# Patient Record
Sex: MALE | Race: WHITE
[De-identification: names, ages, dates, MRNs, and addresses within clinical notes are randomized per-mention and may not be internally consistent; named-entity substitution may affect disease eponyms.]

---

## 2017-01-01 ENCOUNTER — HOSPITAL ENCOUNTER (EMERGENCY)
Dept: HOSPITAL 80 - FED | Age: 27
Discharge: HOME | End: 2017-01-01
Payer: MEDICAID

## 2017-01-01 VITALS
OXYGEN SATURATION: 96 % | SYSTOLIC BLOOD PRESSURE: 119 MMHG | HEART RATE: 93 BPM | TEMPERATURE: 99.7 F | DIASTOLIC BLOOD PRESSURE: 72 MMHG

## 2017-01-01 VITALS — RESPIRATION RATE: 16 BRPM

## 2017-01-01 DIAGNOSIS — F17.200: ICD-10-CM

## 2017-01-01 DIAGNOSIS — J40: Primary | ICD-10-CM

## 2017-01-01 LAB
% IMMATURE GRANULYOCYTES: 0.6 % (ref 0–1.1)
ABSOLUTE IMMATURE GRANULOCYTES: 0.04 10^3/UL (ref 0–0.1)
ABSOLUTE NRBC COUNT: 0 10^3/UL (ref 0–0.01)
ADD DIFF?: NO
ADD MORPH?: NO
ADD SCAN?: YES
ANION GAP SERPL CALC-SCNC: 16 MEQ/L (ref 8–16)
APTT BLD: 31.4 SEC (ref 23–38)
ATYPICAL LYMPHOCYTE FLAG: 140 (ref 0–99)
BILIRUB SERPL-MCNC: 0.6 MG/DL (ref 0.1–1.4)
CALCIUM SERPL-MCNC: 9.5 MG/DL (ref 8.5–10.4)
CHLORIDE SERPL-SCNC: 104 MEQ/L (ref 97–110)
CO2 SERPL-SCNC: 21 MEQ/L (ref 22–31)
COLOR UR: YELLOW
CREAT SERPL-MCNC: 0.8 MG/DL (ref 0.7–1.3)
ERYTHROCYTE [DISTWIDTH] IN BLOOD BY AUTOMATED COUNT: 11.7 % (ref 11.5–15.2)
FRAGMENT RBC FLAG: 0 (ref 0–99)
GFR SERPL CREATININE-BSD FRML MDRD: > 60 ML/MIN/{1.73_M2}
GLUCOSE SERPL-MCNC: 96 MG/DL (ref 70–100)
HCT VFR BLD CALC: 46.8 % (ref 40–51)
HGB BLD-MCNC: 16.8 G/DL (ref 13.7–17.5)
INR PPP: 0.98 (ref 0.83–1.16)
LEFT SHIFT FLG: 0 (ref 0–99)
LIPEMIA HEMOLYSIS FLAG: 90 (ref 0–99)
MCH RBC BLDCO QN: 29.8 PG (ref 27.9–34.1)
MCHC RBC AUTO-ENTMCNC: 35.9 G/DL (ref 32.4–36.7)
MCV RBC AUTO: 83 FL (ref 81.5–99.8)
NITRITE UR QL STRIP: NEGATIVE
NRBC-AUTO%: 0 % (ref 0–0.2)
PH UR STRIP: 5 [PH] (ref 5–7.5)
PLATELET # BLD: 268 10^3/UL (ref 150–400)
PLATELET CLUMPS FLAG: 10 (ref 0–99)
PMV BLD AUTO: 9.5 FL (ref 8.7–11.7)
POTASSIUM SERPL-SCNC: 3.8 MEQ/L (ref 3.5–5.2)
PROTHROMBIN TIME: 12.9 SEC (ref 12–15)
RBC # BLD AUTO: 5.64 10^6/UL (ref 4.4–6.38)
SCAN: NEGATIVE
SODIUM SERPL-SCNC: 141 MEQ/L (ref 134–144)
SP GR UR STRIP: 1.02 (ref 1–1.03)

## 2017-01-01 NOTE — DX
Chest, PA Upright and Lateral Views - January 1, 2017, at 2:11 p.m.

 

Clinical History: 26-year-old male who has had a cough for 36 hours, and recent travel to [place].

 

Comparison Study: None.

 

Findings: Telemetry monitoring lead lines are present. The cardiac and mediastinal silhouettes are no
rmal in size. There is a moderate degree of central perihilar bronchial wall thickening. The inspirat
ory depth is to the 11th posterior rib level. The above features are nonspecific, and could represent
 a viral-induced bronchitis, although reactive airways' disease (asthma) could have a similar appeara
nce. There is no focal infiltrate, pleural effusion, peripheral interstitial edema, or pneumothorax. 
The trachea is midline. The osseous structures are age-appropriate.

 

Impression:  Moderate perihilar bronchitis. There is no focal infiltrate.

## 2017-01-01 NOTE — EDPHY
H & P


Time Seen by Provider: 01/01/17 13:42


HPI/ROS: 


CHIEF COMPLAINT:   fever, chest pain





HISTORY OF PRESENT ILLNESS:  Patient is a 26-year-old male who presents to the 

emergency department with fever and mild chest discomfort with cough.  Patient 

states that he just returned from a 10 day trip from [place].  On 12/25/2016 

developed diarrhea.  He took an over the counter medication called "Diarrescue.

"  This did not change his symptoms. He took his mom's antibiotics, cefixime, x 

3 days.  His diarrhea resolved and he felt better.  States that while improved 

he hurt his left foot.  Upon returning home he continued to have left foot 

pain.  There is no puncture wound or laceration.  He thought I broke my foot.

  Went to urgent care on 12/30/2016 and had a negative foot x-ray.  His foot 

pain has improved.  However on the 31st he woke up feeling crappy.  He noted 

that he had a fever to 102. He complains of mild lower substernal chest 

discomfort with occasional nonproductive cough.  No shortness of breath or 

dyspnea on exertion.  No calf pain or swelling. Patient states that his 

diarrhea has completely improved.  He has no abdominal symptoms.  He denies 

nausea or vomiting.  He has had no dysuria or frequency.  He denies neck pain 

or stiffness. No headache or visual change.  He has no sore throat. He has not 

noticed any rash.





REVIEW OF SYSTEMS:  


My complete review of systems is negative except as mentioned in the HPI.


Past Medical/Surgical History: 


Negative





Past surgical history:  Negative





Social history:  The patient does not smoke or use drugs.


Smoking Status: Current some day smoker


Physical Exam: 


39.4, 145/83, 114, 20, 96% on room air


GENERAL:  Well-appearing -patient appears better than his temperature and heart 

rate would indicate. No acute distress, alert.


HEENT:  Eyes normal to inspection, normal pharynx, no signs of dehydration.


NECK:  No thyromegaly, no lymphadenopathy, supple. No meningismus


RESPIRATORY:  Clear to auscultation bilaterally, no rales, rhonchi or wheezing.


CVS:  Regular rate and rhythm, no rubs, murmurs, or gallops.


ABDOMEN:  Soft, nontender, nondistended, no organomegaly.  Benign


BACK:  Normal to inspection, no CVA tenderness.


SKIN:  Normal color, no rash, warm, dry.  No pallor.


EXTREMITIES:  No pedal edema, no calf tenderness, no joint swelling.


NEURO/PSYCH:  Alert and oriented x3, normal mood and affect, normal motor 

sensory exam.  No obvious cranial nerve deficit.


Constitutional: 


 Initial Vital Signs











Temperature (C)  39.4 C H  01/01/17 13:33


 


Heart Rate  114 H  01/01/17 13:33


 


Respiratory Rate  20   01/01/17 13:33


 


Blood Pressure  145/83 H  01/01/17 13:33


 


O2 Sat (%)  96   01/01/17 13:33








 











O2 Delivery Mode               Room Air














Allergies/Adverse Reactions: 


 





No Known Allergies Allergy (Unverified 01/01/17 13:33)


 








Home Medications: 














 Medication  Instructions  Recorded


 


Cefixime  01/01/17


 


Finasteride  01/01/17














Medical Decision Making


ED Course/Re-evaluation: 


In the emergency department an IV was placed.  The patient was given a L of 

normal saline for hydration.  Laboratory studies, EKG, chest x-ray and urine 

were sent.  I will order stool sample since the patient is able to have a bowel 

movement.  Patient was given Toradol 30 mg IV for fever discomfort.  I 

discussed the plan with the patient answers questions.





I rechecked the patient on numerous occasions.  He looks well on his recheck.  

Had no new complaints.





Discussed the patient's laboratory studies.  Flu is negative. White count was 

normal. Lactate was 1.9.  CO2 is minimally low at 21. Urine was negative.





Chest x-ray:  Please refer the dictated report by Dr. Cummins.  Patient has 

bronchitis.  No focal infiltrate or pneumonia.





I discussed the case with Dr. Derick Stout on the phone.  He is not feel the 

patient needs further testing for malaria or other infectious Disease.





16 45:  I discussed the results with the patient again.  I answered all his 

questions.  On recheck he was doing well. No respiratory distress. His abdomen 

was benign.  I gave him warnings prior to leaving.  He will return with 

worsening symptoms.


Differential Diagnosis: 


My differential includes but is not limited to bacteremia, sepsis, pneumonia, 

bronchitis, infectious diarrhea, traveler's diarrhea, ameoba or worm infection, 

malaria





- Data Points


Laboratory Results: 


 Laboratory Results





 01/01/17 14:05 





 01/01/17 14:05 





 











  01/01/17 01/01/17





  15:25 14:05


 


WBC    6.43 10^3/uL





    (3.80-9.50) 


 


RBC    5.64 10^6/uL





    (4.40-6.38) 


 


Hgb    16.8 g/dL





    (13.7-17.5) 


 


Hct    46.8 %





    (40.0-51.0) 


 


MCV    83.0 fL





    (81.5-99.8) 


 


MCH    29.8 pg





    (27.9-34.1) 


 


MCHC    35.9 g/dL





    (32.4-36.7) 


 


RDW    11.7 %





    (11.5-15.2) 


 


Plt Count    268 10^3/uL





    (150-400) 


 


MPV    9.5 fL





    (8.7-11.7) 


 


Neut % (Auto)    67.8 %





    (39.3-74.2) 


 


Lymph % (Auto)    13.4 L %





    (15.0-45.0) 


 


Mono % (Auto)    17.1 H %





    (4.5-13.0) 


 


Eos % (Auto)    0.8 %





    (0.6-7.6) 


 


Baso % (Auto)    0.3 %





    (0.3-1.7) 


 


Nucleat RBC Rel Count    0.0 %





    (0.0-0.2) 


 


Absolute Neuts (auto)    4.36 10^3/uL





    (1.70-6.50) 


 


Absolute Lymphs (auto)    0.86 L 10^3/uL





    (1.00-3.00) 


 


Absolute Monos (auto)    1.10 H 10^3/uL





    (0.30-0.80) 


 


Absolute Eos (auto)    0.05 10^3/uL





    (0.03-0.40) 


 


Absolute Basos (auto)    0.02 10^3/uL





    (0.02-0.10) 


 


Absolute Nucleated RBC    0.00 10^3/uL





    (0-0.01) 


 


Immature Gran %    0.6 %





    (0.0-1.1) 


 


Immature Gran #    0.04 10^3/uL





    (0.00-0.10) 


 


PT    12.9 SEC





    (12.0-15.0) 


 


INR    0.98 





    (0.83-1.16) 


 


APTT    31.4 SEC





    (23.0-38.0) 


 


VBG Lactic Acid    1.9 mmol/L





    (0.7-2.1) 


 


Sodium    141 mEq/L





    (134-144) 


 


Potassium    3.8 mEq/L





    (3.5-5.2) 


 


Chloride    104 mEq/L





    () 


 


Carbon Dioxide    21 L mEq/l





    (22-31) 


 


Anion Gap    16 mEq/L





    (8-16) 


 


BUN    14 mg/dL





    (7-23) 


 


Creatinine    0.8 mg/dL





    (0.7-1.3) 


 


Estimated GFR    > 60 





   


 


Glucose    96 mg/dL





    () 


 


Calcium    9.5 mg/dL





    (8.5-10.4) 


 


Total Bilirubin    0.6 mg/dL





    (0.1-1.4) 


 


Urine Color  YELLOW   





   


 


Urine Appearance  CLEAR   





   


 


Urine pH  5.0   





   (5.0-7.5)  


 


Ur Specific Gravity  1.018   





   (1.002-1.030)  


 


Urine Protein  NEGATIVE   





   (NEGATIVE)  


 


Urine Ketones  NEGATIVE   





   (NEGATIVE)  


 


Urine Blood  NEGATIVE   





   (NEGATIVE)  


 


Urine Nitrate  NEGATIVE   





   (NEGATIVE)  


 


Urine Bilirubin  NEGATIVE   





   (NEGATIVE)  


 


Urine Urobilinogen  NEGATIVE EU  





   (0.2-1.0)  


 


Ur Leukocyte Esterase  NEGATIVE   





   (NEGATIVE)  


 


Urine Glucose  NEGATIVE   





   (NEGATIVE)  


 


Influenza Typ A,B (DFA)    NEGATIVE FOR FLU 





    (NEGATIVE) 











Medications Given: 


 








Discontinued Medications





Sodium Chloride (Ns)  1,000 mls @ 0 mls/hr IV ONCE ONE


   PRN Reason: Wide Open


   Stop: 01/01/17 14:28


   Last Admin: 01/01/17 14:29 Dose:  1,000 mls


Sodium Chloride (Ns)  1,000 mls @ 0 mls/hr IV ONCE ONE


   PRN Reason: Wide Open


   Stop: 01/01/17 14:29


   Last Admin: 01/01/17 15:28 Dose:  1,000 mls


Ketorolac Tromethamine (Toradol)  30 mg IVP EDNOW ONE


   Stop: 01/01/17 15:01


   Last Admin: 01/01/17 15:05 Dose:  30 mg








Departure





- Departure


Disposition: Home, Routine, Self-Care


Clinical Impression: 


 Bronchitis, Fever


Condition: Good


Instructions:  Acute Bronchitis (ED), Fever in Adults (ED)


Additional Instructions: 


Return to the emergency department with increasing fever, shortness of breath, 

cough, vomiting, diarrhea or any other concerns.





Your laboratory studies were unremarkable.





Your chest x-ray showed bronchitis.  No pneumonia.





You can take both acetaminophen and ibuprofen for fever control and discomfort.


Referrals: 


Mariya Ortega MD [Medical Doctor] - 5-7 days, call for appt.


Derick Stout MD [Medical Doctor] - 5-7 days, if not improved

## 2018-03-25 ENCOUNTER — HOSPITAL ENCOUNTER (EMERGENCY)
Dept: HOSPITAL 80 - FED | Age: 28
Discharge: HOME | End: 2018-03-25
Payer: MEDICAID

## 2018-03-25 VITALS — OXYGEN SATURATION: 94 %

## 2018-03-25 VITALS
TEMPERATURE: 98.4 F | RESPIRATION RATE: 16 BRPM | HEART RATE: 102 BPM | DIASTOLIC BLOOD PRESSURE: 88 MMHG | SYSTOLIC BLOOD PRESSURE: 142 MMHG

## 2018-03-25 DIAGNOSIS — F17.200: ICD-10-CM

## 2018-03-25 DIAGNOSIS — J18.1: Primary | ICD-10-CM

## 2018-03-25 NOTE — EDPHY
H & P


Stated Complaint: cough, fever, x1 week


Time Seen by Provider: 03/25/18 10:32


HPI/ROS: 


HPI:  This is a 28-year-old male who presents with





Chief Complaint: cough, fever, x1 week





Location:  Chest


Quality:  Cough


Duration:  2 weeks


Signs and Symptoms:+ fever for 2 days, no sore throat, + fatigue, + 

nonproductive cough, + body aches, no neck stiffness, + swollen glands, no ear 

pain, no nausea, no vomiting, no diarrhea, no urinary symptoms, no abdominal 

pain


Timing:  Worsening


Severity:  Moderate


Context:  Patient is generally healthy, + tobacco user, presents with a history 

of 2 week nonproductive cough that worsened over the last 2 days accompanied by 

fever T-max a 102 at home.  He also reports that he had sudden onset of 

fatigue and body aches.  Did not receive influenza vaccine this year.  No 

history of lung disease.  Reports decreased appetite but drinking fluids.  No 

recent foreign travel.  Taking over-the-counter medications with mild relief. 


Modifying Factors:  See above





Comment: 








ROS: see HPI


Constitutional:+ fever, no chills, no weight loss


Eyes:  No blurred vision


Respiratory:  No shortness of breath, + cough


Cardiovascular:  No chest pain


Gastrointestinal:  No nausea, no vomiting, no diarrhea 


Genitourinary:  No dysuria 


Extremities:  No myalgias 


Neurologic:  No weakness, no numbness


Skin:  No rashes


Hematologic:  No bruising, no bleeding





MEDICAL/SURGICAL/SOCIAL HISTORY: 


Medical history:  Generally healthy.  Does not take any regular medications.


Surgical history:  Denies


Social history:  Employed. 











CONSTITUTIONAL:  Polite and cooperative adult male, awake and alert, no obvious 

distress


HEENT: Atraumatic and normocephalic, PERRL, EOMI. Tympanic membranes clear. 

Oropharynx clear, no exudate and moist pink mucosa.  Airway patent.  No 

lymphadenopathy.  No meningismus.


Cardiovascular: Normal S1/S2, tachycardia, regular rhythm, without murmur rub 

or gallop.


PULMONARY/CHEST:  Symmetrical and nontender. Clear to auscultation bilaterally. 

Good air movement. No accessory muscle usage.


ABDOMEN:  Soft, nondistended, nontender, no rebound, no guarding, no peritoneal 

signs, no masses or organomegaly. No CVAT.


EXTREMITIES:  2/2 pulses, strength 5/5, no deformities, no clubbing, no 

cyanosis or edema.


NEUROLOGICAL: no focal neuro deficits.  GCS 15.


SKIN: Warm and dry, no erythema. no rash.  Good capillary refill. 








Source: Patient


Exam Limitations: No limitations





- Personal History


Tetanus Vaccine Date: 2016





- Medical/Surgical History


Hx Asthma: No


Hx Chronic Respiratory Disease: No


Hx Diabetes: No


Hx Cardiac Disease: No


Hx Renal Disease: No


Hx Cirrhosis: No


Hx Alcoholism: No


Hx HIV/AIDS: No


Hx Splenectomy or Spleen Trauma: No


Other PMH: Denies





- Social History


Smoking Status: Current some day smoker


Constitutional: 


 Initial Vital Signs











Temperature (C)  38.7 C H  03/25/18 10:17


 


Heart Rate  112 H  03/25/18 10:17


 


Respiratory Rate  18   03/25/18 10:17


 


Blood Pressure  154/81 H  03/25/18 10:17


 


O2 Sat (%)  94   03/25/18 10:17








 











O2 Delivery Mode               Room Air














Allergies/Adverse Reactions: 


 





No Known Allergies Allergy (Unverified 01/01/17 13:33)


 








Home Medications: 














 Medication  Instructions  Recorded


 


Finasteride  01/01/17


 


Clarithromycin [Biaxin (*)] 500 mg PO BID 7 Days  tab 03/25/18


 


Codeine Phosphate/Guaifenesin 10 ml PO Q6 PRN #473 ml 03/25/18





[Guaifenesin-Codeine Syrup]  














Medical Decision Making





- Diagnostics


Imaging Results: 


 Imaging Impressions





Chest X-Ray  03/25/18 10:36


Impression: Suspect right upper lobe pneumonia. Recommend follow-up chest 

radiograph in 4-6 weeks.  











ED Course/Re-evaluation: 


Chest x-ray, influenza test, oral medications ordered


Given ibuprofen and Tessalon Perle


Vital signs reviewed and tachycardia with fever.  No hypoxia. 


Chest x-ray my read shows possible early development of right upper lobe 

pneumonia. 


Influenza negative


Given prescription for Biaxin. 


No risk factors for TB.  Has not had antibiotics in the last 3 months.  

Appropriate to treat outpatient as community-acquired. 











This patient was seen under the supervision of my secondary supervising 

physician.  I evaluated care for this patient independently. 





Differential Diagnosis: 


Differential including but not limited to viral syndromes including influenza, 

bronchitis, strep pharyngitis, pneumonia and sepsis.








- Data Points


Laboratory Results: 


 











  03/25/18





  10:20


 


Nasal Influenza A PCR  NEGATIVE FOR FLU A 





   (NEGATIVE) 


 


Nasal Influenza B PCR  NEGATIVE FOR FLU B 





   (NEGATIVE) 











Medications Given: 


 








Discontinued Medications





Benzonatate (Tessalon Pearles)  200 mg PO EDNOW ONE


   Stop: 03/25/18 10:40


   Last Admin: 03/25/18 10:44 Dose:  200 mg


Ibuprofen (Motrin)  800 mg PO EDNOW ONE


   Stop: 03/25/18 10:39


   Last Admin: 03/25/18 10:44 Dose:  800 mg








Departure





- Departure


Disposition: Home, Routine, Self-Care


Clinical Impression: 


Community acquired pneumonia


Qualifiers:


 Laterality: right Lung location: upper lobe of lung Qualified Code(s): J18.1 - 

Lobar pneumonia, unspecified organism





Condition: Good


Instructions:  Clarithromycin (By mouth), Narcotic-Antitussive/Decongestant/

Expectorant (By mouth), Community Acquired Pneumonia (ED)


Additional Instructions: 


Chest x-ray shows early developing right upper lobe pneumonia. 


Rest as much as possible until you are feeling better. 


Consume a minimum of 8-10 glasses of water or electrolyte fluid replacement 

drinks that include Gatorade, Powerade, Pedialyte. 


Eat a bland diet for the next 48 hours and then slowly advance as tolerated. 


Take Tylenol 650 mg and/or Ibuprofen 600-800 mg every 8 hr as needed for pain, 

headache, fever. 


Take all of the Clarithromycin/Biaxin as directed for 7 days. 


Take Guaifenesin with Codeine cough syrup every 6 hr as needed for moderate to 

severe cough. 


Please wash your hands frequently, cover your cough, and stay home until you 

are symptom free. 




















Referrals: 


[facility],. [Clinic] - As per Instructions


Prescriptions: 


Clarithromycin [Biaxin (*)] 500 mg PO BID 7 Days  tab


Codeine Phosphate/Guaifenesin [Guaifenesin-Codeine Syrup] 10 ml PO Q6 PRN #473 

ml


 PRN Reason: Cough, Moderate